# Patient Record
(demographics unavailable — no encounter records)

---

## 2025-03-04 NOTE — PLAN
Progress note    C/C: left low back and leg pain    HPI: 26-year-old male presents for follow-up. Unfortunately no improvement following left L4 and L5 transforaminal epidural steroid injection under fluoroscopy, local, on 11/19/2021.   He continues to [employer]
[FreeTextEntry1] : Patient to follow up in 1 year for annual GYN exam Mammogram and bilateral breast US due:   now rx given  Colonoscopy due:  per alpern  Bone density due:  per hamburger    Pap ordered Hemoccult ordered  All questions answered, patient agreeable with plan.     I Laura SHARP am scribing for the presence of Dr. Maradiaga the following sections HISTORY OF PRESENT ILLNESS, PAST MEDICAL/FAMILY/SOCIAL HISTORY; REVIEW OF SYSTEMS; VITAL SIGNS; PHYSICAL EXAM; DISPOSITION.    I personally performed the services described in the documentation, reviewed the documentation recorded by the scribe in my presence and it accurately and completely records my words and actions.   I Laura SHARP am scribing for the presence of Dr. Maradiaga the following sections HISTORY OF PRESENT ILLNESS, PAST MEDICAL/FAMILY/SOCIAL HISTORY; REVIEW OF SYSTEMS; VITAL SIGNS; PHYSICAL EXAM; DISPOSITION.    I personally performed the services described in the documentation, reviewed the documentation recorded by the scribe in my presence and it accurately and completely records my words and actions.
[FreeTextEntry1] : Patient to follow up in 1 year for annual GYN exam Mammogram and bilateral breast US due:   now rx given  Colonoscopy due:  per alpern  Bone density due:  per hamburger    Pap ordered Hemoccult ordered  All questions answered, patient agreeable with plan.     I Laura SHARP am scribing for the presence of Dr. Maradiaga the following sections HISTORY OF PRESENT ILLNESS, PAST MEDICAL/FAMILY/SOCIAL HISTORY; REVIEW OF SYSTEMS; VITAL SIGNS; PHYSICAL EXAM; DISPOSITION.    I personally performed the services described in the documentation, reviewed the documentation recorded by the scribe in my presence and it accurately and completely records my words and actions.   I Laura SHARP am scribing for the presence of Dr. Maradiaga the following sections HISTORY OF PRESENT ILLNESS, PAST MEDICAL/FAMILY/SOCIAL HISTORY; REVIEW OF SYSTEMS; VITAL SIGNS; PHYSICAL EXAM; DISPOSITION.    I personally performed the services described in the documentation, reviewed the documentation recorded by the scribe in my presence and it accurately and completely records my words and actions.

## 2025-03-04 NOTE — HISTORY OF PRESENT ILLNESS
[TextBox_4] : Patient is an 84 yo female here today for annual visit. last year had a thickened stripe with normal biopsy no bleeding, and 3 small ovarian cysts  Followed by Dr. santizo for her bone density, she is being treated with nasal calcitonin, s/p course of Fosamax. she denies any GYN complaints at this time  patient BP elevated today she is asymptomatic she is followed by her PMD.

## 2025-03-04 NOTE — PHYSICAL EXAM
[Chaperone Present] : A chaperone was present in the examining room during all aspects of the physical examination [Appropriately responsive] : appropriately responsive [Alert] : alert [No Acute Distress] : no acute distress [No Lymphadenopathy] : no lymphadenopathy [Soft] : soft [Non-tender] : non-tender [Non-distended] : non-distended [No HSM] : No HSM [No Lesions] : no lesions [No Mass] : no mass [Oriented x3] : oriented x3 [Examination Of The Breasts] : a normal appearance [No Discharge] : no discharge [No Masses] : no breast masses were palpable [Labia Majora] : normal [Labia Minora] : normal [Normal] : normal [Uterine Adnexae] : normal [Normal rectal exam] : was normal [FreeTextEntry2] : Laura BHATT-GILMER chaperoned during entire physical exam, [Occult Blood Positive] : was negative for occult blood analysis